# Patient Record
Sex: MALE | Race: OTHER | HISPANIC OR LATINO | URBAN - METROPOLITAN AREA
[De-identification: names, ages, dates, MRNs, and addresses within clinical notes are randomized per-mention and may not be internally consistent; named-entity substitution may affect disease eponyms.]

---

## 2020-03-23 ENCOUNTER — EMERGENCY (EMERGENCY)
Facility: HOSPITAL | Age: 25
LOS: 1 days | Discharge: HOME | End: 2020-03-23
Payer: MEDICAID

## 2020-03-23 VITALS
DIASTOLIC BLOOD PRESSURE: 73 MMHG | SYSTOLIC BLOOD PRESSURE: 121 MMHG | RESPIRATION RATE: 16 BRPM | TEMPERATURE: 100 F | HEIGHT: 66 IN | HEART RATE: 92 BPM | OXYGEN SATURATION: 92 % | WEIGHT: 175.05 LBS

## 2020-03-23 VITALS — OXYGEN SATURATION: 97 % | RESPIRATION RATE: 20 BRPM | HEART RATE: 80 BPM

## 2020-03-23 PROCEDURE — 90472 IMMUNIZATION ADMIN EACH ADD: CPT

## 2020-03-23 PROCEDURE — 90715 TDAP VACCINE 7 YRS/> IM: CPT

## 2020-03-23 PROCEDURE — 90675 RABIES VACCINE IM: CPT

## 2020-03-23 PROCEDURE — 90471 IMMUNIZATION ADMIN: CPT

## 2020-03-23 PROCEDURE — 90375 RABIES IG IM/SC: CPT

## 2020-03-23 PROCEDURE — U0001: CPT

## 2020-03-23 PROCEDURE — 99283 EMERGENCY DEPT VISIT LOW MDM: CPT | Mod: 25

## 2020-03-23 PROCEDURE — 71046 X-RAY EXAM CHEST 2 VIEWS: CPT | Mod: 26

## 2020-03-23 PROCEDURE — 71046 X-RAY EXAM CHEST 2 VIEWS: CPT

## 2020-03-23 PROCEDURE — 99283 EMERGENCY DEPT VISIT LOW MDM: CPT

## 2020-03-23 PROCEDURE — 96372 THER/PROPH/DIAG INJ SC/IM: CPT

## 2020-03-23 RX ORDER — ACETAMINOPHEN 500 MG
650 TABLET ORAL ONCE
Refills: 0 | Status: COMPLETED | OUTPATIENT
Start: 2020-03-23 | End: 2020-03-23

## 2020-03-23 RX ORDER — RABIES VACC, HUMAN DIPLOID/PF 2.5 UNIT
1 VIAL (EA) INTRAMUSCULAR ONCE
Refills: 0 | Status: COMPLETED | OUTPATIENT
Start: 2020-03-23 | End: 2020-03-23

## 2020-03-23 RX ORDER — HUMAN RABIES VIRUS IMMUNE GLOBULIN 150 [IU]/ML
1600 INJECTION, SOLUTION INTRAMUSCULAR ONCE
Refills: 0 | Status: COMPLETED | OUTPATIENT
Start: 2020-03-23 | End: 2020-03-23

## 2020-03-23 RX ORDER — TETANUS TOXOID, REDUCED DIPHTHERIA TOXOID AND ACELLULAR PERTUSSIS VACCINE, ADSORBED 5; 2.5; 8; 8; 2.5 [IU]/.5ML; [IU]/.5ML; UG/.5ML; UG/.5ML; UG/.5ML
0.5 SUSPENSION INTRAMUSCULAR ONCE
Refills: 0 | Status: COMPLETED | OUTPATIENT
Start: 2020-03-23 | End: 2020-03-23

## 2020-03-23 RX ADMIN — HUMAN RABIES VIRUS IMMUNE GLOBULIN 1600 UNIT(S): 150 INJECTION, SOLUTION INTRAMUSCULAR at 18:12

## 2020-03-23 RX ADMIN — Medication 650 MILLIGRAM(S): at 15:34

## 2020-03-23 RX ADMIN — Medication 200 MILLIGRAM(S): at 15:34

## 2020-03-23 RX ADMIN — Medication 1 MILLILITER(S): at 15:46

## 2020-03-23 RX ADMIN — TETANUS TOXOID, REDUCED DIPHTHERIA TOXOID AND ACELLULAR PERTUSSIS VACCINE, ADSORBED 0.5 MILLILITER(S): 5; 2.5; 8; 8; 2.5 SUSPENSION INTRAMUSCULAR at 15:35

## 2020-03-23 NOTE — ED PROVIDER NOTE - PHYSICAL EXAMINATION
R third MCP area. shallow wound , healing, scabbed over  No localized swelling , discharge or tenderness   full range of motion of all fingers.

## 2020-03-23 NOTE — ED PROVIDER NOTE - NS_EDPROVIDERDISPOUSERTYPE_ED_A_ED
Detail Level: Generalized Detail Level: Zone Detail Level: Simple Scribe Attestation (For Scribes USE Only)... Attending Attestation (For Attendings USE Only).../Scribe Attestation (For Scribes USE Only)...

## 2020-03-23 NOTE — ED PROVIDER NOTE - PATIENT PORTAL LINK FT
You can access the FollowMyHealth Patient Portal offered by [facility] by registering at the following website: [email or web address]. By joining [facility]’s FollowMyHealth portal, you will also be able to view your health information using other applications (apps) compatible with our system.

## 2020-03-23 NOTE — ED PROVIDER NOTE - CLINICAL SUMMARY MEDICAL DECISION MAKING FREE TEXT BOX
flu like symptoms and dog bite x3 days ago. Well appearing , febrile in triage , no sign of respiratory distress, chest x-ray and initiate rabies prophylaxis. Update tetanus and reassess. Patient will need to self isolate for an additional x5 days.

## 2020-03-23 NOTE — ED PROVIDER NOTE - NSFOLLOWUPINSTRUCTIONS_ED_ALL_ED_FT
Viral Illness, Adult  Viruses are tiny germs that can get into a person's body and cause illness. There are many different types of viruses, and they cause many types of illness. Viral illnesses can range from mild to severe. They can affect various parts of the body.  Common illnesses that are caused by a virus include colds and the flu. Viral illnesses also include serious conditions such as HIV/AIDS (human immunodeficiency virus/acquired immunodeficiency syndrome). A few viruses have been linked to certain cancers.  What are the causes?  Many types of viruses can cause illness. Viruses invade cells in your body, multiply, and cause the infected cells to malfunction or die. When the cell dies, it releases more of the virus. When this happens, you develop symptoms of the illness, and the virus continues to spread to other cells. If the virus takes over the function of the cell, it can cause the cell to divide and grow out of control, as is the case when a virus causes cancer.  Different viruses get into the body in different ways. You can get a virus by:  Swallowing food or water that is contaminated with the virus.Breathing in droplets that have been coughed or sneezed into the air by an infected person.Touching a surface that has been contaminated with the virus and then touching your eyes, nose, or mouth.Being bitten by an insect or animal that carries the virus.Having sexual contact with a person who is infected with the virus.Being exposed to blood or fluids that contain the virus, either through an open cut or during a transfusion.If a virus enters your body, your body's defense system (immune system) will try to fight the virus. You may be at higher risk for a viral illness if your immune system is weak.  What are the signs or symptoms?  Symptoms vary depending on the type of virus and the location of the cells that it invades. Common symptoms of the main types of viral illnesses include:  Cold and flu viruses     Fever.Headache.Sore throat.Muscle aches.Nasal congestion.Cough.Digestive system (gastrointestinal) viruses     Fever.Abdominal pain.Nausea.Diarrhea.Liver viruses (hepatitis)     Loss of appetite.Tiredness.Yellowing of the skin (jaundice).Brain and spinal cord viruses     Fever.Headache.Stiff neck.Nausea and vomiting.Confusion or sleepiness.Skin viruses     Warts.Itching.Rash.Sexually transmitted viruses     Discharge.Swelling.Redness.Rash.How is this treated?  Viruses can be difficult to treat because they live within cells. Antibiotic medicines do not treat viruses because these drugs do not get inside cells. Treatment for a viral illness may include:  Resting and drinking plenty of fluids.Medicines to relieve symptoms. These can include over-the-counter medicine for pain and fever, medicines for cough or congestion, and medicines to relieve diarrhea.Antiviral medicines. These drugs are available only for certain types of viruses. They may help reduce flu symptoms if taken early. There are also many antiviral medicines for hepatitis and HIV/AIDS.Some viral illnesses can be prevented with vaccinations. A common example is the flu shot.  Follow these instructions at home:  Medicines        Take over-the-counter and prescription medicines only as told by your health care provider.If you were prescribed an antiviral medicine, take it as told by your health care provider. Do not stop taking the medicine even if you start to feel better.Be aware of when antibiotics are needed and when they are not needed. Antibiotics do not treat viruses. If your health care provider thinks that you may have a bacterial infection as well as a viral infection, you may get an antibiotic.  Do not ask for an antibiotic prescription if you have been diagnosed with a viral illness. That will not make your illness go away faster.Frequently taking antibiotics when they are not needed can lead to antibiotic resistance. When this develops, the medicine no longer works against the bacteria that it normally fights.General instructions     Drink enough fluids to keep your urine clear or pale yellow.Rest as much as possible.Return to your normal activities as told by your health care provider. Ask your health care provider what activities are safe for you.Keep all follow-up visits as told by your health care provider. This is important.How is this prevented?  Take these actions to reduce your risk of viral infection:  Eat a healthy diet and get enough rest.Wash your hands often with soap and water. This is especially important when you are in public places. If soap and water are not available, use hand .Avoid close contact with friends and family who have a viral illness.If you travel to areas where viral gastrointestinal infection is common, avoid drinking water or eating raw food.Keep your immunizations up to date. Get a flu shot every year as told by your health care provider.Do not share toothbrushes, nail clippers, razors, or needles with other people.Always practice safe sex.Contact a health care provider if:  You have symptoms of a viral illness that do not go away.Your symptoms come back after going away.Your symptoms get worse.Get help right away if:  You have trouble breathing.You have a severe headache or a stiff neck.You have severe vomiting or abdominal pain.This information is not intended to replace advice given to you by your health care provider. Make sure you discuss any questions you have with your health care provider.    Document Released: 04/28/2017 Document Revised: 05/31/2017 Document Reviewed: 04/28/2017  [employer] Interactive Patient Education © 2020 [employer]

## 2020-03-23 NOTE — ED PROVIDER NOTE - OBJECTIVE STATEMENT
25 y.o male with no PMHx and no PSHx presents to the ED c/o x3 days ago a dog bite as well as fever , intermittent cough, body aches, chills. Patient was bit at the right hand third knuckle by dog on street, patient does not have any information on dog. Patient denies any localized redness, pain or streaking. Patient denies any shortness of breath , chest pain or any other acute complaints. Patient last Tetanus is unknown. NKDA

## 2020-03-25 LAB — SARS-COV-2 RNA SPEC QL NAA+PROBE: DETECTED

## 2023-02-03 ENCOUNTER — EMERGENCY (EMERGENCY)
Facility: HOSPITAL | Age: 28
LOS: 1 days | Discharge: HOME | End: 2023-02-03
Attending: EMERGENCY MEDICINE
Payer: SELF-PAY

## 2023-02-03 VITALS
HEART RATE: 86 BPM | SYSTOLIC BLOOD PRESSURE: 112 MMHG | RESPIRATION RATE: 16 BRPM | OXYGEN SATURATION: 96 % | TEMPERATURE: 98 F | DIASTOLIC BLOOD PRESSURE: 74 MMHG | WEIGHT: 179.9 LBS

## 2023-02-03 VITALS
SYSTOLIC BLOOD PRESSURE: 123 MMHG | OXYGEN SATURATION: 98 % | HEART RATE: 72 BPM | DIASTOLIC BLOOD PRESSURE: 80 MMHG | RESPIRATION RATE: 16 BRPM

## 2023-02-03 PROBLEM — Z78.9 OTHER SPECIFIED HEALTH STATUS: Chronic | Status: ACTIVE | Noted: 2020-03-23

## 2023-02-03 PROCEDURE — 96372 THER/PROPH/DIAG INJ SC/IM: CPT

## 2023-02-03 PROCEDURE — 72128 CT CHEST SPINE W/O DYE: CPT | Mod: MA

## 2023-02-03 PROCEDURE — 72125 CT NECK SPINE W/O DYE: CPT | Mod: MA

## 2023-02-03 PROCEDURE — 70450 CT HEAD/BRAIN W/O DYE: CPT | Mod: 26,MA

## 2023-02-03 PROCEDURE — 72131 CT LUMBAR SPINE W/O DYE: CPT | Mod: MA

## 2023-02-03 PROCEDURE — 70450 CT HEAD/BRAIN W/O DYE: CPT | Mod: MA

## 2023-02-03 PROCEDURE — 72131 CT LUMBAR SPINE W/O DYE: CPT | Mod: 26,MA

## 2023-02-03 PROCEDURE — 72125 CT NECK SPINE W/O DYE: CPT | Mod: 26,MA

## 2023-02-03 PROCEDURE — 72128 CT CHEST SPINE W/O DYE: CPT | Mod: 26,MA

## 2023-02-03 PROCEDURE — 99284 EMERGENCY DEPT VISIT MOD MDM: CPT | Mod: 25

## 2023-02-03 PROCEDURE — 99284 EMERGENCY DEPT VISIT MOD MDM: CPT

## 2023-02-03 RX ORDER — IBUPROFEN 200 MG
1 TABLET ORAL
Qty: 20 | Refills: 0
Start: 2023-02-03 | End: 2023-02-07

## 2023-02-03 RX ORDER — METHOCARBAMOL 500 MG/1
2 TABLET, FILM COATED ORAL
Qty: 18 | Refills: 0
Start: 2023-02-03 | End: 2023-02-05

## 2023-02-03 RX ORDER — LIDOCAINE 4 G/100G
1 CREAM TOPICAL
Qty: 7 | Refills: 0
Start: 2023-02-03 | End: 2023-02-09

## 2023-02-03 RX ORDER — LIDOCAINE 4 G/100G
1 CREAM TOPICAL ONCE
Refills: 0 | Status: COMPLETED | OUTPATIENT
Start: 2023-02-03 | End: 2023-02-03

## 2023-02-03 RX ORDER — METHOCARBAMOL 500 MG/1
1500 TABLET, FILM COATED ORAL ONCE
Refills: 0 | Status: COMPLETED | OUTPATIENT
Start: 2023-02-03 | End: 2023-02-03

## 2023-02-03 RX ORDER — KETOROLAC TROMETHAMINE 30 MG/ML
30 SYRINGE (ML) INJECTION ONCE
Refills: 0 | Status: DISCONTINUED | OUTPATIENT
Start: 2023-02-03 | End: 2023-02-03

## 2023-02-03 RX ADMIN — Medication 30 MILLIGRAM(S): at 16:54

## 2023-02-03 RX ADMIN — LIDOCAINE 1 PATCH: 4 CREAM TOPICAL at 16:54

## 2023-02-03 RX ADMIN — METHOCARBAMOL 1500 MILLIGRAM(S): 500 TABLET, FILM COATED ORAL at 16:53

## 2023-02-03 NOTE — ED PROVIDER NOTE - NSFOLLOWUPINSTRUCTIONS_ED_ALL_ED_FT
Renita un seguimiento con schultz médico de atención primaria dentro de los 7 días. Si no tiene rafia, puede llamar al número de la oficina de [facility] a continuación y hacer arnulfo earl.    [facility]  [facility]  [address] [facility] [address]  Teléfono: [phone]  Fax: [phone]    Medicamentos para el dolor enviados a la farmacia para hazel síntomas. Aram según las indicaciones.    Si experimenta síntomas nuevos o que empeoran, o si está preocupado, siempre puede regresar a la emergencia para arnulfo reevaluación.    Follow up with your Primary Care Doctor within 7 days. If you do not have one, you can call the Internal Medicine office number below and make an appointment.    [facility]  Internal Medicine  [address] [address]  Phone: [phone]  Fax: [phone]     Pain medication sent to the pharmacy for your symptoms. Take as directed.     If you experience any new or worsening symptoms or if you are concerned you can always come back to the emergency for a re-evaluation.     Dolor de espalda    El dolor de espalda puede rohit miedo. Segun incluso cuando el dolor es intenso, por lo general desaparece por sí solo en unas pocas semanas. Los casos que requieren atención urgente o cirugía son raros. No asumas lo peor. Missy todo el nina tiene dolor de espalda en algún momento.    Consulte a schultz médico o enfermera si tiene dolor de espalda y usted:    -Recientemente tuvo arnulfo caída o arnulfo lesión en la espalda    -Tiene entumecimiento o debilidad en las piernas    -Tiene problemas con el control de la vejiga o el intestino    -Tiene pérdida de peso inexplicable    -Tener fiebre o sentirse enfermo de otras maneras    -Aram medicamentos esteroides, anupma prednisona, de forma regular.    -Tiene diabetes o un problema médico que debilita schultz sistema inmunológico    -Tener antecedentes de cáncer u osteoporosis.    También debe consultar a un médico si:    -Schultz dolor de espalda es tan severo que no puede realizar tareas simples    -Schultz dolor de espalda no comienza a mejorar en 4 semanas    Muchas cosas diferentes pueden causar dolor lumbar. La mayoría de las veces, los médicos no conocen la causa exacta.    El dolor de espalda puede ocurrir si se esfuerza un músculo. [place] es a menudo lo que daugherty sucedido cuando arnulfo persona "se estiven" la espalda. [place] se refiere al dolor que comienza repentinamente después de la actividad física, anupam levantar algo pesado o doblar la espalda.    El dolor de espalda también puede ocurrir si tiene:    -Discos dañados, abultados o rotos    -Artritis que afecta las articulaciones de la columna vertebral    - Crecimientos óseos en las vértebras que aprietan los nervios cercanos    -Arnulfo vértebra fuera de lugar    -Estrechamiento en el canal linn    -Un tumor o infección (segun esto es muy raro)    La mayoría de las personas con un episodio de dolor lumbar no tienen un problema médico grave y pueden probar tratamientos simples anupam:    -Mantenerse activo: lo mejor que puede hacer es mantenerse lo más activo posible. Las personas con dolor lumbar se recuperan más rápido si se mantienen activas. Si schultz dolor es intenso, es posible que deba descansar cielo rafia o dos días. Segun es importante volver a caminar y moverse lo antes posible. Si keith debe evitar el levantamiento de objetos pesados ??y los deportes mientras le duele la espalda, intente continuar con hazel actividades diarias normales.    -Calor: a algunas personas les resulta útil usar arnulfo almohadilla térmica o arnulfo envoltura térmica. Tenga cuidado de evitar los ajustes de calor alto para evitar quemaduras en la piel.    -Medicamentos: althea, puede probar analgésicos que puede obtener sin receta médica. En muchos casos, los médicos sugieren probar althea un medicamento antiinflamatorio no esteroideo o "MARIBEL". Los MARIBEL incluyen ibuprofeno (marcas de muestra: Advil, Motrin) y naproxeno (marcas de muestra: Aleve). Estos podrían funcionar mejor que el paracetamol (Tylenol) para el dolor de espalda.    -Tratamientos para ayudar con los síntomas: algunos tratamientos pueden ayudarlo a sentirse mejor por un tiempo. Incluyen:    -Manipulación de la columna: esto es cuando un quiropráctico, fisioterapeuta u otro profesional mueve o "ajusta" las articulaciones de la espalda. Si quiere probar esto, hable althea con schultz médico o enfermera.    -Acupuntura: esto es cuando alguien que conoce la medicina tradicional [place] inserta pequeñas agujas en schultz cuerpo para bloquear las señales de dolor.    -Masaje    Si keith el dolor de espalda generalmente desaparece en unas pocas semanas, algunas personas continúan teniendo dolor por más tiempo. En omar nisa, los tratamientos adicionales pueden incluir:    -Cuidado propio: esto implica ser consciente de schultz dolor. Si keith debe descansar cuando lo necesite, es importante mantenerse activo tanto anupam pueda. Cosas anupam aplicar calor y hacer estiramientos suaves también pueden ayudarlo a sentirse mejor.    -Fisioterapia: un fisioterapeuta es un experto en ejercicios que puede enseñarle estiramientos y movimientos para ayudarlo a fortalecer hazel músculos. El objetivo es aliviar el dolor segun también ayudarlo a volver a haezl actividades normales. Los ejercicios que puede probar incluyen caminar, nadar o usar arnulfo bicicleta estática. Algunas personas también encuentran que el [place] o el yoga pueden ayudar con el dolor de espalda. Encontrar actividades que disfrute puede ayudarlo a mantenerse activo.    -Reducir el estrés: a algunas personas les resulta útil probar algo llamado "reducción del estrés basada en la atención plena". [place] implica asistir a un programa grupal para practicar la relajación y la meditación. Si schultz dolor de espalda lo hace sentir ansioso o deprimido, hable con schultz médico o enfermera. Existen otros tratamientos que pueden ayudar con estos problemas.

## 2023-02-03 NOTE — ED PROVIDER NOTE - PROGRESS NOTE DETAILS
CT negative for acute fracture. Will dc home with pcp follow up. Pt is well appearing walking with steady gait, stable for discharge and follow up without fail with medical doctor. I had a detailed discussion with the patient and/or guardian regarding the historical points, exam findings, and any diagnostic results supporting the discharge diagnosis. Pt educated on care and need for follow up. Strict return instructions and red flag signs and symptoms discussed with patient. Questions answered. Pt shows understanding of discharge information and agrees to follow.

## 2023-02-03 NOTE — ED PROVIDER NOTE - OBJECTIVE STATEMENT
27 year old male with no PHMx presents to the ED with reports he was putting a cable on top of ceiling and as he was going down the ladder, he fell approximately 5 feet and landed on his back. Reports one episode of vomiting and is complaining of neck and lower back pain. Denies loss of consciousness, numbness and tingling in his arms and legs or abdominal pain. NKDA.

## 2023-02-03 NOTE — ED PROVIDER NOTE - PATIENT PORTAL LINK FT
You can access the FollowMyHealth Patient Portal offered by [facility] by registering at the following website: [email or web address]. By joining "[employer]’s FollowMyHealth portal, you will also be able to view your health information using other applications (apps) compatible with our system.

## 2023-02-03 NOTE — ED PROVIDER NOTE - NS ED ATTENDING STATEMENT MOD
This was a shared visit with the EDWAR. I reviewed and verified the documentation and independently performed the documented:

## 2023-02-03 NOTE — ED ADULT NURSE NOTE - OBJECTIVE STATEMENT
States he fell from a ladder about 5 feet while at work today c/o pain to back of neck and lower back .States he was wearing his hard hat when he fell . Denies LOC .With episode of vomiting after the fall.

## 2023-02-03 NOTE — ED PROVIDER NOTE - CLINICAL SUMMARY MEDICAL DECISION MAKING FREE TEXT BOX
27 year old male s/p fall from ladder, will obtain CT head, burrows, cervical thoracic and lumbar spine and give medications for pain.

## 2023-02-03 NOTE — ED PROVIDER NOTE - PHYSICAL EXAMINATION
Back is symmetrical, scapulae are symmetric. Neck has full range of motion. +Midline, cervical, thoracic and lumbar tenderness, no deformity or step-offs. All limbs equally strong 5+ bilaterally. Strong ankle dorsiflexion and plantar flexion against resistance bilaterally. Strong flexion/extension of big toe bilaterally. Pt is able to walk in straight line with steady gait. No recent weight loss or night sweats. No saddle anesthesia, no bowel/bladder incontinence or retention, no lower extremity weakness.     Head is normocephalic and atraumatic. No head tenderness or skull depression on palpation. No temporal cord-like sensation, increased warmth or tenderness. Pt is alert and oriented x 3, able to follow commands. No facial asymmetry. Pupils 5 mm equally round with PERRL, no nystagmus, EOM intact. Cranial nerves III-XII grossly intact. Coordination nose-to-finger intact. All limbs equally strong bilaterally 5/5. No pronator drift. No numbness or tingling sensation. Neck is non-tender, supple with full range of motion. No nuchal rigidity. Back is symmetrical, scapulae are symmetric. Neck has full range of motion. +Midline, cervical, thoracic and lumbar tenderness, no deformity or step-offs. All limbs equally strong 5+ bilaterally. Strong ankle dorsiflexion and plantar flexion against resistance bilaterally. Strong flexion/extension of big toe bilaterally. Pt is able to walk in straight line with steady gait. No recent weight loss or night sweats. No saddle anesthesia, no bowel/bladder incontinence or retention, no lower extremity weakness.     Head is normocephalic and atraumatic. No head tenderness or skull depression on palpation. No temporal cord-like sensation, increased warmth or tenderness. Pt is alert and oriented x 3, able to follow commands. No facial asymmetry. Pupils 5 mm equally round with PERRL, no nystagmus, EOM intact. Cranial nerves III-XII grossly intact. Coordination nose-to-finger intact. All limbs equally strong bilaterally 5/5. No pronator drift. No numbness or tingling sensation. No nuchal rigidity.